# Patient Record
Sex: MALE | Race: BLACK OR AFRICAN AMERICAN | Employment: FULL TIME | URBAN - METROPOLITAN AREA
[De-identification: names, ages, dates, MRNs, and addresses within clinical notes are randomized per-mention and may not be internally consistent; named-entity substitution may affect disease eponyms.]

---

## 2024-06-11 ENCOUNTER — OFFICE VISIT (OUTPATIENT)
Dept: ORTHOPEDIC SURGERY | Age: 49
End: 2024-06-11
Payer: COMMERCIAL

## 2024-06-11 VITALS — HEIGHT: 77 IN | WEIGHT: 313 LBS | BODY MASS INDEX: 36.96 KG/M2

## 2024-06-11 DIAGNOSIS — M25.461 EFFUSION, RIGHT KNEE: ICD-10-CM

## 2024-06-11 DIAGNOSIS — M25.561 RIGHT KNEE PAIN, UNSPECIFIED CHRONICITY: Primary | ICD-10-CM

## 2024-06-11 DIAGNOSIS — M17.11 ARTHRITIS OF RIGHT KNEE: ICD-10-CM

## 2024-06-11 PROCEDURE — 99202 OFFICE O/P NEW SF 15 MIN: CPT | Performed by: PHYSICIAN ASSISTANT

## 2024-06-11 PROCEDURE — 20610 DRAIN/INJ JOINT/BURSA W/O US: CPT | Performed by: PHYSICIAN ASSISTANT

## 2024-06-11 RX ORDER — BUPIVACAINE HYDROCHLORIDE 2.5 MG/ML
2 INJECTION, SOLUTION INFILTRATION; PERINEURAL ONCE
Status: COMPLETED | OUTPATIENT
Start: 2024-06-11 | End: 2024-06-11

## 2024-06-11 RX ORDER — TRIAMCINOLONE ACETONIDE 40 MG/ML
40 INJECTION, SUSPENSION INTRA-ARTICULAR; INTRAMUSCULAR ONCE
Status: COMPLETED | OUTPATIENT
Start: 2024-06-11 | End: 2024-06-11

## 2024-06-11 RX ADMIN — BUPIVACAINE HYDROCHLORIDE 5 MG: 2.5 INJECTION, SOLUTION INFILTRATION; PERINEURAL at 09:29

## 2024-06-11 RX ADMIN — TRIAMCINOLONE ACETONIDE 40 MG: 40 INJECTION, SUSPENSION INTRA-ARTICULAR; INTRAMUSCULAR at 09:28

## 2024-06-11 NOTE — PROGRESS NOTES
Subjective:      Patient ID: Huber Avelar is a 49 y.o. male who is here for an initial evaluation of right knee pain and swelling.    HPI:   He states on 5/24/2024 he was changing positions while lying in bed when he twisted his right knee.  He felt a pop sensation in the right knee followed by pain.  He was initially evaluated at [facility].  He was prescribed prednisone, provided crutches and has been wearing a brace.  MRI scheduled at [facility] ([place], [place]) on 6/12/2024.  He is having difficulty ambulating on his right knee.  He requires use of crutches.  He is not comfortable driving due to excessive right knee swelling.    Pain Scale 6/10 VAS.  Location of pain global right knee.  Pain is worse with weightbearing..   Pain improves with elevation.       Review of Systems:  I have reviewed the clinically relevant past medical history, medications, allergies, family history, social history, and 13 point Review of Systems from the patient's recent history form & documented any details relevant to today's presenting complaints in the history above. The patient's self-reported past medical history, medications, allergies, family history, social history, and Review of Systems form from today's date have been scanned into the chart under the \"Media\" tab.    As outlined in the HPI.  Negative for poly-joint pain, swelling and stiffness.  Negative numbness or tingling into the affected extremity.     No past medical history on file.    No family history on file.    No past surgical history on file.    Social History     Occupational History    Not on file   Tobacco Use    Smoking status: Not on file    Smokeless tobacco: Not on file   Substance and Sexual Activity    Alcohol use: Not on file    Drug use: Not on file    Sexual activity: Not on file       Current Outpatient Medications   Medication Sig Dispense Refill    metFORMIN (GLUCOPHAGE) 500 MG tablet Take 1 tablet by mouth 2 times daily (with meals)       No current

## 2024-06-25 ENCOUNTER — OFFICE VISIT (OUTPATIENT)
Dept: ORTHOPEDIC SURGERY | Age: 49
End: 2024-06-25
Payer: COMMERCIAL

## 2024-06-25 VITALS — HEIGHT: 77 IN | RESPIRATION RATE: 17 BRPM | WEIGHT: 313 LBS | BODY MASS INDEX: 36.96 KG/M2

## 2024-06-25 DIAGNOSIS — M17.11 ARTHRITIS OF RIGHT KNEE: Primary | ICD-10-CM

## 2024-06-25 PROCEDURE — 99213 OFFICE O/P EST LOW 20 MIN: CPT | Performed by: PHYSICIAN ASSISTANT

## 2024-06-25 NOTE — PROGRESS NOTES
and function.    9)    Activity modification.  10)  Maintaining Ideal body weight.    Although not specifically listed in the CPGs, ice therapy and topical patches such as Salonpas are good options as well.    Other non-surgical options including Coolief (cooled frequency ablation of the geniculate nerves), stem cell injections, PRP injections were discussed.       Follow up-    Call or return to clinic if these symptoms worsen or fail to improve as anticipated.    The total time spent on today's visit including reviewing test results, history, performance of physical exam, counseling/ education, ordering of medications, tests or procedures was 24 minutes.  This time does include completion of the medical record.  This time excludes any time spent performing procedures or tests in the office.                                                Dwayne Mejia PA-C   Senior Physician Assistant   [facility]                                         Disclaimer:  This note was generated with use of a verbal recognition program (DRAGON) and an attempt was made to check for errors.  It is possible that there are still dictated errors within this office note.  If so, please bring any significant errors to my attention for an addendum.  All efforts were made to ensure that this office note is accurate.